# Patient Record
Sex: MALE | Race: WHITE | NOT HISPANIC OR LATINO | Employment: FULL TIME | URBAN - METROPOLITAN AREA
[De-identification: names, ages, dates, MRNs, and addresses within clinical notes are randomized per-mention and may not be internally consistent; named-entity substitution may affect disease eponyms.]

---

## 2019-04-11 ENCOUNTER — HOSPITAL ENCOUNTER (EMERGENCY)
Facility: CLINIC | Age: 24
Discharge: HOME | End: 2019-04-11
Admitting: PHYSICIAN ASSISTANT
Payer: OTHER MISCELLANEOUS

## 2019-04-11 VITALS
RESPIRATION RATE: 16 BRPM | OXYGEN SATURATION: 100 % | HEART RATE: 85 BPM | TEMPERATURE: 97.8 F | DIASTOLIC BLOOD PRESSURE: 81 MMHG | SYSTOLIC BLOOD PRESSURE: 151 MMHG

## 2019-04-11 DIAGNOSIS — S61.217A LACERATION OF LEFT LITTLE FINGER WITHOUT FOREIGN BODY WITHOUT DAMAGE TO NAIL, INITIAL ENCOUNTER: ICD-10-CM

## 2019-04-11 PROCEDURE — 12001 RPR S/N/AX/GEN/TRNK 2.5CM/<: CPT

## 2019-04-11 PROCEDURE — 27210282 ZZH ADHESIVE DERMABOND SKIN

## 2019-04-11 PROCEDURE — 99283 EMERGENCY DEPT VISIT LOW MDM: CPT

## 2019-04-11 ASSESSMENT — ENCOUNTER SYMPTOMS
NUMBNESS: 0
WEAKNESS: 0
WOUND: 1

## 2019-04-11 NOTE — DISCHARGE INSTRUCTIONS
Watch the area surrounding the wound for signs of infection which can include increased redness, drainage, fevers, or swelling. Inspect the area daily. No swimming or baths for the next 3-5 days, showering is ok. Avoid lotions/soaps to the area.     Discharge Instructions  Laceration (Cut)    You were seen today for a laceration (cut).  Your doctor examined your laceration for any problems such a buried foreign body (like glass, a splinter, or gravel), or injury to blood vessels, tendons, and nerves.  Your doctor may have also rinsed and/or scrubbed your laceration to help prevent an infection.  Your laceration may have been closed with glue, staples or sutures (stitches).      It may not be possible to find all problems with your laceration on the first visit, and we can't always prevent infections.  Antibiotics are only given when the benefit is more than the risk, and don't prevent all infections. Some lacerations are too high risk to close, and are left open to heal.  All lacerations, no matter how expertly repaired, will cause scarring.    Return to the Emergency Department right away if:  You have more redness, swelling, pain, drainage (pus), a bad smell, or red streaking from your laceration.    You have a fever of 101oF or more.  You have bleeding that you can?t stop at home. If your cut starts to bleed, hold pressure on the bleeding area with a clean cloth or put pressure over the bandage.  If the bleeding doesn?t stop after using constant pressure for 30 minutes, you should return to the Emergency Department for further treatment.  An area past the laceration is cool, pale, or blue compared with the other side, or has a slower return of color when squeezed.  Your dressing seems too tight or starts to get uncomfortable or painful.  You have loss of normal function or use of an area, such as being unable to straighten or bend a finger normally.  You have a numb area past the laceration.    Return to the  "Emergency Department or see your regular doctor if:  The laceration starts to come open.   You have something coming out of the cut or a feeling that there is something in the laceration.  Your wound will not heal, or keeps breaking open. There can always be glass, wood, dirt or other things in any wound.  They won?t always show up, even on x-rays.  If a wound doesn?t heal, this may be why, and it is important to follow-up with your regular doctor.    Home Care:  Take your dressing off in 12 hours, or as instructed by your doctor, to check your laceration. Remove the dressing sooner if it seems too tight or painful, or if it is getting numb, tingly, or pale past the dressing.  Gently wash your laceration 2 times a day with clean cloth and soap.   It is okay to shower, but do not let the laceration soak in water.    If your laceration was closed with wound adhesive or strips: pat it dry and leave it open to the air.   For all other repairs: after you wash your laceration, or at least 2 times a day, apply bacitracin or other antibiotic ointment to the laceration, then cover it with a Band-Aid  or gauze.  Keep the laceration clean. Wear gloves or other protective clothing if you are around dirt.    Scars:  To help minimize scarring:  Wear sunscreen over the healed laceration when out in the sun.  Massage the area regularly.  You may use Vitamin E oil.  Wait a year.  Most scars will start to fade within a year.    Probiotics: If you have been given an antibiotic, you may want to also take a probiotic pill or eat yogurt with live cultures. Probiotics have \"good bacteria\" to help your intestines stay healthy. Studies have shown that probiotics help prevent diarrhea and other intestine problems (including C. diff infection) when you take antibiotics. You can buy these without a prescription in the pharmacy section of the store.     If you were given a prescription for medicine here today, be sure to read all of the " information (including the package insert) that comes with your prescription.  This will include important information about the medicine, its side effects, and any warnings that you need to know about.  The pharmacist who fills the prescription can provide more information and answer questions you may have about the medicine.  If you have questions or concerns that the pharmacist cannot address, please call or return to the Emergency Department.

## 2019-04-11 NOTE — ED PROVIDER NOTES
History     Chief Complaint:  Laceration    HPI   Rakesh Corrales is an otherwise healthy 23 year old male who presents to the ED for evaluation of a laceration. The patient reports that he was at work at [employer] this afternoon using the , when his hand slipped. He sustained a laceration to his left pinky finger secondary to the incident, so he presented to the ED for evaluation. Here in the ED, he adds that he has rinsed the area out. He rates the pain a 2/10 currently. He denies any numbness, weakness, or other symptoms or concerns. He additionally denies any nail involvement. Of note, his tetanus was last updated in 2012.    Allergies:  NKDA    Medications:    The patient is currently on no regular medications.    Past Medical History:    The patient denies any significant past medical history.    Past Surgical History:    The patient does not have any pertinent past surgical history.    Family History:    No past pertinent family history.    Social History:  Works at [employer]    Review of Systems   Skin: Positive for wound.   Neurological: Negative for weakness and numbness.   All other systems reviewed and are negative.    Physical Exam     Patient Vitals for the past 24 hrs:   BP Temp Temp src Pulse Resp SpO2   04/11/19 1702 151/81 97.8  F (36.6  C) Oral 85 16 100 %     Physical Exam  General: Well appearing, well nourished. Normal mood and affect.  Skin:  Approximately 1 cm flap-like laceration to the pad of the left 5th digit, superficial in nature. No visualized bone or tendon injury.   HEENT: Head: Normocephalic, atraumatic, no visible masses.   Eyes: Conjunctiva clear.  Neck: Supple, without lymphadenopathy.  Cardiac: Normal rate and regular rhythm, no murmur or gallop.   Lungs: Clear to auscultation.  Musculoskeletal: Normal gait and station.   Left Hand:  Full flexion extension of wrist without pain or difficulty. The finger flexors (FDS/FDP) and extensors are intact. Able to make  okay sign, thumbs up, peace sign and cross fingers.  The thumb exam is normal, including: Adduction, abduction, flexion, extension, opposition. There are no sensory deficits, Median, Ulnar, and Radial nerve function is normal. Radial artery pulsations are normal. Capillary refill is normal.   Neurologic: Oriented x 3. GCS: 15.    Emergency Department Course     Procedures:   Laceration Repair        LACERATION:  A simple and superficial clean 1 cm flap-like laceration.      LOCATION:  Left Pinky Finger      FUNCTION:  Distally sensation, circulation, motor and tendon function are intact.      PREPARATION:  Irrigation and Scrubbing with Normal Saline and Shur Clens      DEBRIDEMENT:  no debridement      CLOSURE:  Wound was closed with Dermabond    Emergency Department Course:  Nursing notes and vitals reviewed. (1711) I performed an exam of the patient as documented above.     (1724) I rechecked the patient and discussed the results of his workup thus far.     Findings and plan explained to the Patient. Patient discharged home with instructions regarding supportive care, medications, and reasons to return. The importance of close follow-up was reviewed.    I personally answered all related questions prior to discharge.     Impression & Plan      Medical Decision Making:  Rakesh Corrales is a 23 year old male who presents to the ED today for evaluation of a laceration.  Details of the patient's history can be noted in the HPI.  The wound was carefully explored and evaluated.  The laceration was closed as noted in the procedure note above.  There was no evidence of muscular, tendon, bone, or nerve damage with this laceration.  There is no evidence of foreign body.  Possible complications such as infection and scarring were reviewed with the patient.  They will return to the ED for any signs of increased redness, streaking, drainage, fevers, new concerns.  They will apply bacitracin daily.  They will follow-up with his  primary care provider or another medical facility and provided in the discharge paperwork. All questions were answered prior to the patient's discharge.  They were in agreement with the treatment plan as stated above.    Diagnosis:    ICD-10-CM    1. Laceration of left little finger without foreign body without damage to nail, initial encounter S61.217A      Disposition:  discharged to home    Scribe Disclosure:  I, Rebekah Rashel, am serving as a scribe on 4/11/2019 at 5:00 PM to personally document services performed by Connie Poon found based on my observations and the provider's statements to me.     This was created at least in part with a voice recognition software. Mistakes/typos may be present.     Rebekah Kiser  4/11/2019    EMERGENCY DEPARTMENT       Connie Poon PA  04/11/19 1949

## 2019-04-11 NOTE — ED AVS SNAPSHOT
Emergency Department  [address] [place] [id number]  Phone:  [phone]  Fax:  [phone]                                    Rakesh Corrales   MRN: [id number]    Department:   Emergency Department   Date of Visit:  4/11/2019           After Visit Summary Signature Page    I have received my discharge instructions, and my questions have been answered. I have discussed any challenges I see with this plan with the nurse or doctor.    ..........................................................................................................................................  Patient/Patient Representative Signature      ..........................................................................................................................................  Patient Representative Print Name and Relationship to Patient    ..................................................               ................................................  Date                                   Time    ..........................................................................................................................................  Reviewed by Signature/Title    ...................................................              ..............................................  Date                                               Time          22EPIC Rev 08/18

## 2020-10-17 ENCOUNTER — IMMUNIZATION (OUTPATIENT)
Dept: NURSING | Facility: CLINIC | Age: 25
End: 2020-10-17

## 2020-10-17 PROCEDURE — 90686 IIV4 VACC NO PRSV 0.5 ML IM: CPT

## 2020-10-17 PROCEDURE — 90471 IMMUNIZATION ADMIN: CPT

## 2022-09-21 ENCOUNTER — OFFICE VISIT (OUTPATIENT)
Dept: FAMILY MEDICINE | Facility: CLINIC | Age: 27
End: 2022-09-21
Payer: COMMERCIAL

## 2022-09-21 VITALS
BODY MASS INDEX: 20.74 KG/M2 | HEART RATE: 74 BPM | HEIGHT: 73 IN | DIASTOLIC BLOOD PRESSURE: 75 MMHG | OXYGEN SATURATION: 97 % | SYSTOLIC BLOOD PRESSURE: 116 MMHG | WEIGHT: 156.5 LBS | RESPIRATION RATE: 12 BRPM | TEMPERATURE: 97.8 F

## 2022-09-21 DIAGNOSIS — Z23 NEED FOR VACCINATION: ICD-10-CM

## 2022-09-21 DIAGNOSIS — B35.6 TINEA CRURIS: ICD-10-CM

## 2022-09-21 DIAGNOSIS — Z13.220 SCREENING FOR LIPID DISORDERS: ICD-10-CM

## 2022-09-21 DIAGNOSIS — Z00.00 ANNUAL PHYSICAL EXAM: Primary | ICD-10-CM

## 2022-09-21 LAB
ANION GAP SERPL CALCULATED.3IONS-SCNC: 10 MMOL/L (ref 7–15)
BUN SERPL-MCNC: 13.2 MG/DL (ref 6–20)
CALCIUM SERPL-MCNC: 9.2 MG/DL (ref 8.6–10)
CHLORIDE SERPL-SCNC: 100 MMOL/L (ref 98–107)
CHOLEST SERPL-MCNC: 154 MG/DL
CREAT SERPL-MCNC: 0.97 MG/DL (ref 0.67–1.17)
DEPRECATED HCO3 PLAS-SCNC: 27 MMOL/L (ref 22–29)
GFR SERPL CREATININE-BSD FRML MDRD: >90 ML/MIN/1.73M2
GLUCOSE SERPL-MCNC: 105 MG/DL (ref 70–99)
HDLC SERPL-MCNC: 80 MG/DL
LDLC SERPL CALC-MCNC: 64 MG/DL
NONHDLC SERPL-MCNC: 74 MG/DL
POTASSIUM SERPL-SCNC: 4.1 MMOL/L (ref 3.4–5.3)
SODIUM SERPL-SCNC: 137 MMOL/L (ref 136–145)
TRIGL SERPL-MCNC: 52 MG/DL

## 2022-09-21 PROCEDURE — 80061 LIPID PANEL: CPT | Performed by: FAMILY MEDICINE

## 2022-09-21 PROCEDURE — 80048 BASIC METABOLIC PNL TOTAL CA: CPT | Performed by: FAMILY MEDICINE

## 2022-09-21 RX ORDER — THERMOMETER, ELECTRONIC,ORAL
EACH MISCELLANEOUS 2 TIMES DAILY
Qty: 30 G | Refills: 0 | Status: SHIPPED | OUTPATIENT
Start: 2022-09-21 | End: 2024-06-10

## 2022-09-21 ASSESSMENT — ENCOUNTER SYMPTOMS
CHILLS: 0
PARESTHESIAS: 0
NAUSEA: 0
DIARRHEA: 0
HEARTBURN: 0
HEMATOCHEZIA: 0
WEAKNESS: 0
PALPITATIONS: 0
EYE PAIN: 0
JOINT SWELLING: 0
HEMATURIA: 0
HEADACHES: 0
SHORTNESS OF BREATH: 0
DIZZINESS: 0
FEVER: 0
COUGH: 0
CONSTIPATION: 0
ABDOMINAL PAIN: 0
ARTHRALGIAS: 0
DYSURIA: 0
NERVOUS/ANXIOUS: 0
SORE THROAT: 0
MYALGIAS: 0
FREQUENCY: 0

## 2022-09-21 NOTE — NURSING NOTE
"    27 year old  Chief Complaint   Patient presents with     Physical     Rash     Groin area        Blood pressure (!) 142/83, pulse 74, temperature 97.8  F (36.6  C), temperature source Skin, resp. rate 12, height 1.855 m (6' 1.03\"), weight 71 kg (156 lb 8 oz), SpO2 97 %. Body mass index is 20.63 kg/m .  There is no problem list on file for this patient.      Wt Readings from Last 2 Encounters:   09/21/22 71 kg (156 lb 8 oz)     BP Readings from Last 3 Encounters:   09/21/22 (!) 142/83   04/11/19 151/81         Current Outpatient Medications   Medication     HEMP OIL OR EXTRACT OR OTHER CBD CANNABINOID, NOT MEDICAL CANNABIS,     No current facility-administered medications for this visit.       Social History     Tobacco Use     Smoking status: Current Every Day Smoker     Packs/day: 0.50     Years: 6.00     Pack years: 3.00     Types: Vaping Device     Smokeless tobacco: Never Used   Vaping Use     Vaping Use: Every day     Substances: Nicotine, Flavoring     Devices: [employer]   Substance Use Topics     Alcohol use: Yes     Alcohol/week: 14.0 standard drinks     Types: 14 Standard drinks or equivalent per week     Drug use: Yes     Frequency: 7.0 times per week     Types: Marijuana       Health Maintenance Due   Topic Date Due     PREVENTIVE CARE VISIT  Never done     ADVANCE CARE PLANNING  Never done     Pneumococcal Vaccine: Pediatrics (0 to 5 Years) and At-Risk Patients (6 to 64 Years) (1 - PCV) Never done     HIV SCREENING  Never done     HEPATITIS C SCREENING  Never done     COVID-19 Vaccine (3 - Booster for Pfizer series) 07/16/2021     DTAP/TDAP/TD IMMUNIZATION (5 - Td or Tdap) 07/19/2022     INFLUENZA VACCINE (1) 09/01/2022       No results found for: PAP      September 21, 2022 3:03 PM    "

## 2022-09-21 NOTE — NURSING NOTE
"Injectable Influenza Immunization Documentation    1.  Has the patient received the information for the injectable influenza vaccine? YES     2. Is the patient 6 months of age or older? YES     3. Does the patient have any of the following contraindications?         Severe allergy to eggs? No     Severe allergic reaction to previous influenza vaccines? No   Severe allergy to latex? No       History of Guillain-Blackwell syndrome? No     Currently have a temperature greater than 100.4F? No        4.  Severely egg allergic patients should have flu vaccine eligibility assessed by an MD, RN, or pharmacist, and those who received flu vaccine should be observed for 15 min by an MD, RN, Pharmacist, Medical Technician, or member of clinic staff.\": YES    5. Latex-allergic patients should be given latex-free influenza vaccine Yes. Please reference the Vaccine latex table to determine if your clinic s product is latex-containing.       Vaccination given by Jenniffer Dolan CMA,[facility]  September 21, 2022 4:09 PM                "

## 2022-09-21 NOTE — PROGRESS NOTES
"Rakesh Corrales presents to [facility] today for his first visit to [facility].  He is here to have an annual exam.      ASSESSMENT/PLAN:    Annual Exam/Preventive Issues   -Check Lipids, BMP  - Give Influenza and Tdap vaccines  - Suggested more exercise, e.g. weight lifting and outdoor leisure exercise  - Suggested discontinuing Vaping. Rakesh \"not ready now\"  -Suggested having a few nights/week of abstinence from alcohol.     Encouraged to sign up for Mychart      -Specific concerns:     1. Tinea Cruris  - Tinactin  -Keep area dry  - Minimize eating sugar   - If no improvement, let us know      -Follow up: in 1 year or sooner if needed    Urban Belcher MD, MS    Introduction: Rakesh is making his first visit to our clinic.  He reports not having had an annual exam in about 10 years.  His wife felt that it would be a good idea for him to have an annual exam.  He had no specific concerns other than a groin rash.  No pain with urination.  No discharge.  No concerns of STDs.    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  They have a child, b. 10/12/2020.       Lifestyle habits and Preventive health issues:     Physical activity: Stands most of the day. Also, walks daily.     Diet is moderately healthy. \"Not enough fruits and veggies\" during lunch.     Alcohol intake involves about 7  nights/week and 1-2 drinks/night. He agrees that it's \"not the best\"    He vapes tobacco products. Also uses THC.     His sleep is pretty good.\"     ROS  PHQ-2 Score:     PHQ-2 ( 1999 Pfizer) 9/21/2022   Q1: Little interest or pleasure in doing things 0   Q2: Feeling down, depressed or hopeless 0   PHQ-2 Score 0   Q1: " Little interest or pleasure in doing things Not at all   Q2: Feeling down, depressed or hopeless Not at all   PHQ-2 Score 0       Health Maintenance   Topic Date Due     NICOTINE/TOBACCO CESSATION COUNSELING Q 1 YR  Never done     PREVENTIVE CARE VISIT  Never done     ADVANCE CARE PLANNING  Never done     Pneumococcal Vaccine: Pediatrics (0 to 5 Years) and At-Risk Patients (6 to 64 Years) (1 - PCV) Never done     HIV SCREENING  Never done     HEPATITIS C SCREENING  Never done     COVID-19 Vaccine (3 - Booster for Pfizer series) 07/16/2021     DTAP/TDAP/TD IMMUNIZATION (5 - Td or Tdap) 07/19/2022     INFLUENZA VACCINE (1) 09/01/2022     PHQ-2 (once per calendar year)  Completed     IPV IMMUNIZATION  Completed     MENINGITIS IMMUNIZATION  Completed     HEPATITIS B IMMUNIZATION  Completed         There is no problem list on file for this patient.      Past Surgical History:   Procedure Laterality Date     HERNIA REPAIR  1999       Family History   Problem Relation Age of Onset     Multiple Sclerosis Mother      No Known Problems Father      No Known Problems Brother      Liver Disease Paternal Grandfather         Non drinker     Diabetes No family hx of      Heart Disease No family hx of      Cancer No family hx of          Immunizations are as follows:      Immunization History   Administered Date(s) Administered     COVID-19,PF,Pfizer (12+ Yrs) 05/02/2021, 05/21/2021     HepB, Unspecified 1994, 1994, 06/06/1995     Hib, Unspecified 1994, 1994, 03/27/1995, 11/30/1995     Historical DTP/aP 1994, 1994, 03/27/1995, 11/30/1995, 03/09/2000     Influenza (H1N1) 02/13/2010     Influenza (IIV3) PF 12/02/2003     Influenza Vaccine IM > 6 months Valent IIV4 (Alfuria,Fluzone) 11/11/2014, 10/17/2020     Influenza Vaccine, 6+MO IM (QUADRIVALENT W/PRESERVATIVES) 11/09/2021     Influenza,INJ,MDCK,PF,Quad >6mo(Flucelvax-RMG) 01/08/2018, 10/31/2019     MMR 11/30/1995, 03/09/2000     Meningococcal  "(Menactra ) 03/14/2012     Meningococcal (Menomune ) 10/06/2005     Polio, Unspecified  1994, 1994, 03/27/1995, 03/09/2000     TD (ADULT, 7+) 10/06/2005     Tdap (Adacel,Boostrix) 07/19/2012         EXAM  BP (!) 142/83 (BP Location: Right arm, Patient Position: Sitting, Cuff Size: Adult Regular)   Pulse 74   Temp 97.8  F (36.6  C) (Skin)   Resp 12   Ht 1.855 m (6' 1.03\")   Wt 71 kg (156 lb 8 oz)   SpO2 97%   BMI 20.63 kg/m    Repeat blood pressure at 116/75    GENERAL APPEARANCE: He appears in no distress.  He answers questions after a second or 2 pause.  He reports that this is due to him being \"a careful person\"  HEENT: Neck is supple. No adenopathy, thyroid normal to palpation  RESP: Lungs clear to auscultation bilaterally.  Axillae: no palpable axillary masses or adenopathy  CV: regular rate and rhythm, normal S1 S2, no murmur, no carotid bruits  ABDOMEN: soft, nontender, without HSM or masses. Bowel sounds normal  : His skin in the right inguinal crease had a few dime size circular areas of fungal like rash.  This was predominantly on the right side.  Otherwise, he had a normal testicular exam.  No masses.  No hernias.  No lymphadenopathy.   Rectal exam: deferred  MS: Extremities normal- no gross deformities noted, no tender, hot or swollen joints.    SKIN: Other than the groin area with a fungal rash there were no other rashes or atypical moles noted  NEURO: Normal strength and tone, sensory exam grossly normal  PSYCH: mentation appears normal. and affect normal/bright.  EXT: no peripheral edema    SEE TOP OF NOTE FOR ASSESSMENT AND PLAN      Urban Belcher MD, MS  [facility] Department of Family Medicine and [facility]          Answers for HPI/ROS submitted by the patient on 9/21/2022  Frequency of exercise:: 2-3 days/week  Getting at least 3 servings of Calcium per day:: NO  Diet:: Regular (no restrictions)  Taking medications regularly:: Not Applicable  Medication " side effects:: Not applicable  Bi-annual eye exam:: NO  Dental care twice a year:: NO  Sleep apnea or symptoms of sleep apnea:: None  abdominal pain: No  Blood in stool: No  Blood in urine: No  chest pain: No  chills: No  congestion: No  constipation: No  cough: No  diarrhea: No  dizziness: No  ear pain: No  eye pain: No  nervous/anxious: No  fever: No  frequency: No  genital sores: No  headaches: No  hearing loss: No  heartburn: No  arthralgias: No  joint swelling: No  peripheral edema: No  mood changes: No  myalgias: No  nausea: No  dysuria: No  palpitations: No  Skin sensation changes: No  sore throat: No  urgency: No  rash: No  shortness of breath: No  visual disturbance: No  weakness: No  impotence: No  penile discharge: No  Additional concerns today:: No  Duration of exercise:: Greater than 60 minutes

## 2022-12-03 ENCOUNTER — OFFICE VISIT (OUTPATIENT)
Dept: URGENT CARE | Facility: URGENT CARE | Age: 27
End: 2022-12-03
Payer: COMMERCIAL

## 2022-12-03 VITALS
SYSTOLIC BLOOD PRESSURE: 118 MMHG | BODY MASS INDEX: 20.56 KG/M2 | OXYGEN SATURATION: 99 % | RESPIRATION RATE: 18 BRPM | TEMPERATURE: 97.8 F | DIASTOLIC BLOOD PRESSURE: 77 MMHG | WEIGHT: 156 LBS | HEART RATE: 60 BPM

## 2022-12-03 DIAGNOSIS — Z20.828 EXPOSURE TO INFLUENZA: Primary | ICD-10-CM

## 2022-12-03 DIAGNOSIS — R68.89 FLU-LIKE SYMPTOMS: ICD-10-CM

## 2022-12-03 LAB
DEPRECATED S PYO AG THROAT QL EIA: NEGATIVE
FLUAV AG SPEC QL IA: NEGATIVE
FLUBV AG SPEC QL IA: NEGATIVE
GROUP A STREP BY PCR: NOT DETECTED

## 2022-12-03 PROCEDURE — 87804 INFLUENZA ASSAY W/OPTIC: CPT

## 2022-12-03 PROCEDURE — 99213 OFFICE O/P EST LOW 20 MIN: CPT

## 2022-12-03 PROCEDURE — 87651 STREP A DNA AMP PROBE: CPT | Performed by: PHYSICIAN ASSISTANT

## 2022-12-03 RX ORDER — ACETAMINOPHEN 500 MG
500-1000 TABLET ORAL EVERY 6 HOURS PRN
COMMUNITY
End: 2024-06-10

## 2022-12-03 ASSESSMENT — ENCOUNTER SYMPTOMS
HEADACHES: 1
NAUSEA: 1
NUMBNESS: 1
DIZZINESS: 1
WEAKNESS: 1

## 2022-12-03 ASSESSMENT — PAIN SCALES - GENERAL: PAINLEVEL: NO PAIN (0)

## 2022-12-03 NOTE — PROGRESS NOTES
"SUBJECTIVE:   Rakesh Corrales is a 27 year old male presenting with a chief complaint of   Chief Complaint   Patient presents with     Urgent Care     Nausea     Cough     Patient is here for cough and nausea.       He is an established patient of [facility]. Presents with nausea and headache. Began 3 days ago, worsened today. States he is dizzy and has a \"weakness in his arms that feels like they fell asleep.\" No numbness, vision changes, slurred speech, or tinnitus. No injury to the head. Describes persistent nausea but no vomiting. No fevers or cough. Took Tylenol this morning. Regular coffee drinker - tried to drink some this morning after headache started.       Review of Systems   Gastrointestinal: Positive for nausea.   Neurological: Positive for dizziness, weakness, numbness and headaches.   All other systems reviewed and are negative.      Past Medical History:   Diagnosis Date     Depressive disorder      Family History   Problem Relation Age of Onset     Multiple Sclerosis Mother      No Known Problems Father      No Known Problems Brother      Liver Disease Paternal Grandfather         Non drinker     Diabetes No family hx of      Heart Disease No family hx of      Cancer No family hx of      Current Outpatient Medications   Medication Sig Dispense Refill     acetaminophen (TYLENOL) 500 MG tablet Take 500-1,000 mg by mouth every 6 hours as needed for mild pain       HEMP OIL OR EXTRACT OR OTHER CBD CANNABINOID, NOT MEDICAL CANNABIS,  (Patient not taking: Reported on 12/3/2022)       tolnaftate (TINACTIN) 1 % external cream Apply topically 2 times daily (Patient not taking: Reported on 12/3/2022) 30 g 0     Social History     Tobacco Use     Smoking status: Every Day     Packs/day: 0.50     Years: 6.00     Pack years: 3.00     Types: Vaping Device, Cigarettes     Smokeless tobacco: Never   Substance Use Topics     Alcohol use: Yes     Alcohol/week: 14.0 standard drinks     Types: 14 Standard drinks or " equivalent per week       OBJECTIVE  /77 (BP Location: Left arm, Patient Position: Sitting, Cuff Size: Adult Regular)   Pulse 60   Temp 97.8  F (36.6  C) (Oral)   Resp 18   Wt 70.8 kg (156 lb)   SpO2 99%   BMI 20.56 kg/m      Physical Exam  Vitals and nursing note reviewed.   Constitutional:       General: He is not in acute distress.     Appearance: Normal appearance. He is normal weight.   HENT:      Head: Normocephalic.      Right Ear: Tympanic membrane, ear canal and external ear normal.      Left Ear: Tympanic membrane, ear canal and external ear normal.      Nose: Nose normal.      Mouth/Throat:      Mouth: Mucous membranes are moist.      Pharynx: Oropharynx is clear.   Eyes:      Extraocular Movements: Extraocular movements intact.      Conjunctiva/sclera: Conjunctivae normal.      Pupils: Pupils are equal, round, and reactive to light.   Cardiovascular:      Rate and Rhythm: Normal rate and regular rhythm.      Heart sounds: Normal heart sounds.   Pulmonary:      Effort: Pulmonary effort is normal.      Breath sounds: Normal breath sounds.   Musculoskeletal:         General: Normal range of motion.      Cervical back: Normal range of motion.   Skin:     General: Skin is warm and dry.   Neurological:      General: No focal deficit present.      Mental Status: He is alert.      Sensory: No sensory deficit.      Coordination: Coordination normal.      Gait: Gait normal.   Psychiatric:         Mood and Affect: Mood normal.         Behavior: Behavior normal.         Labs:  Results for orders placed or performed in visit on 12/03/22 (from the past 24 hour(s))   Streptococcus A Rapid Screen w/Reflex to PCR - Clinic Collect    Specimen: Throat; Swab   Result Value Ref Range    Group A Strep antigen Negative Negative   Influenza A & B Antigen - Clinic Collect    Specimen: Nose; Swab   Result Value Ref Range    Influenza A antigen Negative Negative    Influenza B antigen Negative Negative    Narrative     Test results must be correlated with clinical data. If necessary, results should be confirmed by a molecular assay or viral culture.       X-Ray was not done.    ASSESSMENT:      ICD-10-CM    1. Exposure to influenza  Z20.828       2. Flu-like symptoms  R68.89            Medical Decision Making:    Differential Diagnosis:  URI Adult/Peds:  Bronchitis-viral, Influenza, Migraine, Strep pharyngitis, Tension headache, Viral pharyngitis, Viral syndrome and Viral upper respiratory illness    Serious Comorbid Conditions:  Adult:  reviewed    PLAN:    Headache:  Tylenol, Ibuprofen as needed. Discussed presumed Influenza diagnosis given exposure to his wife who is positive in clinic today. Stay well hydrated. Discussed expected course of illness and red flag symptoms for which to seek immediate follow up.     Followup:    If not improving or if condition worsens, follow up with your Primary Care Provider    There are no Patient Instructions on file for this visit.

## 2023-11-05 ENCOUNTER — HEALTH MAINTENANCE LETTER (OUTPATIENT)
Age: 28
End: 2023-11-05

## 2023-12-03 ENCOUNTER — OFFICE VISIT (OUTPATIENT)
Dept: URGENT CARE | Facility: URGENT CARE | Age: 28
End: 2023-12-03
Payer: COMMERCIAL

## 2023-12-03 ENCOUNTER — E-VISIT (OUTPATIENT)
Dept: URGENT CARE | Facility: CLINIC | Age: 28
End: 2023-12-03
Payer: COMMERCIAL

## 2023-12-03 VITALS
HEART RATE: 74 BPM | DIASTOLIC BLOOD PRESSURE: 69 MMHG | SYSTOLIC BLOOD PRESSURE: 113 MMHG | RESPIRATION RATE: 16 BRPM | BODY MASS INDEX: 21.03 KG/M2 | OXYGEN SATURATION: 98 % | TEMPERATURE: 98.5 F | WEIGHT: 159.56 LBS

## 2023-12-03 DIAGNOSIS — L42 PITYRIASIS ROSEA: Primary | ICD-10-CM

## 2023-12-03 DIAGNOSIS — Z23 FLU VACCINE NEED: ICD-10-CM

## 2023-12-03 DIAGNOSIS — R21 RASH: Primary | ICD-10-CM

## 2023-12-03 DIAGNOSIS — Z23 NEED FOR VACCINATION: ICD-10-CM

## 2023-12-03 PROCEDURE — 90471 IMMUNIZATION ADMIN: CPT | Performed by: INTERNAL MEDICINE

## 2023-12-03 PROCEDURE — 99213 OFFICE O/P EST LOW 20 MIN: CPT | Mod: 25 | Performed by: INTERNAL MEDICINE

## 2023-12-03 PROCEDURE — 90686 IIV4 VACC NO PRSV 0.5 ML IM: CPT | Performed by: INTERNAL MEDICINE

## 2023-12-03 PROCEDURE — 99207 PR NON-BILLABLE SERV PER CHARTING: CPT | Performed by: NURSE PRACTITIONER

## 2023-12-03 ASSESSMENT — ENCOUNTER SYMPTOMS: FEVER: 0

## 2023-12-03 NOTE — NURSING NOTE
Clinic Administered Medication Documentation        Patient was given annual flu vaccine. Prior to medication administration, verified patient's identity using patient s name and date of birth. Please see MAR and medication order for additional information. Patient instructed to remain in clinic for 15 minutes and report any adverse reaction to staff immediately. The patient was given the flu vaccine information sheet.    Vial/Syringe: Titi Lopez MA

## 2023-12-03 NOTE — PATIENT INSTRUCTIONS
Hydrating cream or ointment  Eucerin or aquaphor  Cortisone cream  If bad itch try antihistamine like zyrtec or benadryl.

## 2023-12-03 NOTE — PROGRESS NOTES
ASSESSMENT AND PLAN:      ICD-10-CM    1. Pityriasis rosea  L42       2. Flu vaccine need  Z23         Discussed may worsen and be present 4 to 6 weeks.  Up to 3 months        Patient Instructions     Hydrating cream or ointment  Eucerin or aquaphor  Cortisone cream  If bad itch try antihistamine like zyrtec or benadryl.      Return if symptoms worsen or fail to improve.        Negrita Chapman MD  [facility] URGENT CARE    Subjective     Rakesh Corrales is a 28 year old who presents for Patient presents with:  Urgent Care: Urgent care visit for rash.  Derm Problem: Rash on his torso and lower neck that has been present since the day prior to Thanksgiving. It has spread and got larger. It is read, raised and feels itchy. The itching is only when the lesions initially form. They do not hurt, and not weeping and do not crust over. He put some Cortisone on them but it only helped with the slight itching.    [facility]    Rash    Onset of rash was 2 week(s) ago.   Course of illness is worsening.  Current and Associated symptoms: itching and red spots.  Started on right upper chest and subsequently spread  Location of the rash: neck and torso.  Previous history of a similar rash? No  Recent exposure history: none known    Associated symptoms include: nothing.  Treatment measures tried include: otc hydrocortisone cream      Review of Systems   Constitutional:  Negative for fever.   No other illness symptoms        Objective    /69 (BP Location: Left arm, Patient Position: Sitting, Cuff Size: Adult Regular)   Pulse 74   Temp 98.5  F (36.9  C) (Tympanic)   Resp 16   Wt 72.4 kg (159 lb 9 oz)   SpO2 98%   BMI 21.03 kg/m    Physical Exam  Vitals reviewed.   Constitutional:       Appearance: Normal appearance. He is not ill-appearing.   Skin:     Findings: Rash (Red papules and oval macules with some flaking noted.  Largest red area right upper trunk consistent with herald  patch) present.   Neurological:      Mental Status: He is alert.

## 2023-12-03 NOTE — PATIENT INSTRUCTIONS
Dear Rakesh Corrales,    We are sorry you are not feeling well. Based on the responses you provided, it is recommended that you be seen in-person in urgent care so we can better evaluate your symptoms. Please click here to find the nearest urgent care location to you.   You will not be charged for this Visit. Thank you for trusting us with your care.    KAREY Herring CNP

## 2024-06-10 ENCOUNTER — OFFICE VISIT (OUTPATIENT)
Dept: FAMILY MEDICINE | Facility: CLINIC | Age: 29
End: 2024-06-10
Payer: COMMERCIAL

## 2024-06-10 VITALS
HEIGHT: 74 IN | RESPIRATION RATE: 15 BRPM | BODY MASS INDEX: 20.15 KG/M2 | HEART RATE: 80 BPM | OXYGEN SATURATION: 97 % | SYSTOLIC BLOOD PRESSURE: 131 MMHG | WEIGHT: 157 LBS | DIASTOLIC BLOOD PRESSURE: 79 MMHG | TEMPERATURE: 98.5 F

## 2024-06-10 DIAGNOSIS — Z00.00 ANNUAL PHYSICAL EXAM: Primary | ICD-10-CM

## 2024-06-10 DIAGNOSIS — Z30.09 FAMILY PLANNING: ICD-10-CM

## 2024-06-10 SDOH — HEALTH STABILITY: PHYSICAL HEALTH: ON AVERAGE, HOW MANY DAYS PER WEEK DO YOU ENGAGE IN MODERATE TO STRENUOUS EXERCISE (LIKE A BRISK WALK)?: 4 DAYS

## 2024-06-10 ASSESSMENT — SOCIAL DETERMINANTS OF HEALTH (SDOH): HOW OFTEN DO YOU GET TOGETHER WITH FRIENDS OR RELATIVES?: ONCE A WEEK

## 2024-06-10 NOTE — PROGRESS NOTES
Preventive Care Visit  [facility]    Rakesh Corrales presents to [facility] today to have an annual exam.      ASSESSMENT/PLAN:    Annual Exam/Preventive Issues   - Labs: We reviewed his labs from 2 years ago. He had normal labs in Sept 2022 except for a blood sugar of 105. He has sweets just before that blood draw and has no symptoms of diabetes.   - Immunizations: up to date except that PCV20 is indicated as he vapes tobacco. He declined the immunization and hopes to quit vaping    - Other recommendations:   Encouraged to decrease alcohol and THC usage. Discussed the issue in terms of parenting as well as personal health  Encouraged trying to find time for leisure time exercise    -Specific concerns:     Requests a vasectomy   -Referred to Urology      -Follow up: as needed or in a year    Urban Belcher MD  Family Medicine and Sports Medicine      INTRODUCTION:   Rakesh's here for his second an annual preventive exam at our clinic. Last was 2 years ago.   He and his wife have recently had their 2nd child and he's interested in a vasectomy.     Past Medical History:   Diagnosis Date    Depressive disorder      States that depression is not a problem now.     Current Medications include:   No current outpatient medications on file.     No Known Allergies    Past Surgical History:   Procedure Laterality Date    HERNIA REPAIR  1999       Health Maintenance   Topic Date Due    NICOTINE/TOBACCO CESSATION COUNSELING Q 1 YR  Never done    ADVANCE CARE PLANNING  Never done    Pneumococcal Vaccine: Pediatrics (0 to 5 Years) and At-Risk Patients (6 to 64 Years) (1 of 2 - PCV) Never done    HIV SCREENING  Never done    HEPATITIS C SCREENING  Never done    COVID-19 Vaccine (3 - 2023-24 season) 09/01/2023    YEARLY PREVENTIVE VISIT  09/21/2023    DTAP/TDAP/TD IMMUNIZATION (8 - Td or Tdap) 09/21/2032    PHQ-2 (once per calendar year)  Completed    INFLUENZA VACCINE  Completed    IPV IMMUNIZATION  Completed     MENINGITIS IMMUNIZATION  Completed    HEPATITIS B IMMUNIZATION  Completed    HPV IMMUNIZATION  Aged Out    RSV MONOCLONAL ANTIBODY  Aged Out       Immunizations are as follows:      Immunization History   Administered Date(s) Administered    COVID-19 MONOVALENT 12+ (Pfizer) 05/02/2021, 05/21/2021    HIB, Unspecified 1994, 1994, 03/27/1995, 11/30/1995    HepB, Unspecified 1994, 1994, 06/06/1995    Historical DTP/aP 1994, 1994, 03/27/1995, 11/30/1995, 03/09/2000    Influenza (H1N1) 02/13/2010    Influenza (IIV3) PF 12/02/2003    Influenza Vaccine >6 months,quad, PF 11/11/2014, 10/17/2020, 09/21/2022, 12/03/2023    Influenza Vaccine, 6+MO IM (QUADRIVALENT W/PRESERVATIVES) 11/09/2021    Influenza,INJ,MDCK,PF,Quad >6mo(Flucelvax) 01/08/2018, 10/31/2019    MMR 11/30/1995, 03/09/2000    Meningococcal (Menomune ) 10/06/2005    Meningococcal ACWY (Menactra ) 03/14/2012    Polio, Unspecified 1994, 1994, 03/27/1995, 03/09/2000    TD,PF 7+ (Tenivac) 10/06/2005    TDAP (Adacel,Boostrix) 07/19/2012, 09/21/2022             6/10/2024   General Health   How would you rate your overall physical health? Good   Feel stress (tense, anxious, or unable to sleep) Only a little   (!) STRESS CONCERN      6/10/2024   Nutrition   Three or more servings of calcium each day? Yes   Diet: Regular (no restrictions)   How many servings of fruit and vegetables per day? (!) 2-3   How many sweetened beverages each day? (!) 2         6/10/2024   Exercise   Days per week of moderate/strenous exercise 4 days         6/10/2024   Social Factors   Frequency of gathering with friends or relatives Once a week   Worry food won't last until get money to buy more No   Food not last or not have enough money for food? No   Do you have housing?  Yes   Are you worried about losing your housing? No   Lack of transportation? No   Unable to get utilities (heat,electricity)? No         6/10/2024   Dental   Dentist two times  every year? (!) NO         6/10/2024   TB Screening   Were you born outside of the [place]? No         Today's PHQ-2 Score:       6/10/2024     3:24 PM   PHQ-2 ( 1999 Pfizer)   Q1: Little interest or pleasure in doing things 1   Q2: Feeling down, depressed or hopeless 0   PHQ-2 Score 1   Q1: Little interest or pleasure in doing things Several days   Q2: Feeling down, depressed or hopeless Not at all   PHQ-2 Score 1           6/10/2024   Substance Use   Alcohol more than 3/day or more than 7/wk Yes   How often do you have a drink containing alcohol 4 or more times a week   How many alcohol drinks on typical day 1 or 2   How often do you have 5+ drinks at one occasion Less than monthly   Audit 2/3 Score 1   How often not able to stop drinking once started Never   How often failed to do what normally expected Never   How often needed first drink in am after a heavy drinking session Never   How often feeling of guilt or remorse after drinking Never   How often unable to remember what happened the night before Never   Have you or someone else been injured because of your drinking No   Has anyone been concerned or suggested you cut down on drinking No   TOTAL SCORE - AUDIT 5   Do you use any other substances recreationally? (!) ALCOHOL    (!) CANNABIS PRODUCTS     Social History     Tobacco Use    Smoking status: Every Day     Current packs/day: 0.50     Average packs/day: 0.5 packs/day for 6.0 years (3.0 ttl pk-yrs)     Types: Vaping Device, Cigarettes     Passive exposure: Never    Smokeless tobacco: Never   Vaping Use    Vaping status: Every Day    Substances: Nicotine, Flavoring    Devices: Refillable tank   Substance Use Topics    Alcohol use: Yes     Alcohol/week: 14.0 standard drinks of alcohol     Types: 14 Standard drinks or equivalent per week    Drug use: Yes     Frequency: 7.0 times per week     Types: Marijuana         Alcohol intake involves about 7 nights/week and 2 drinks/night and weekdays and 3-4 on weekends.  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      to Radhika, an .  They have a child, b. 10/12/2020 and a second b 3/1/2024.     Enjoys: Playing basketball, hiking, camping, and playing video games.        No concerns re: STDs      ROS  PHQ-2 Score:         6/10/2024     3:24 PM 9/21/2022     2:55 PM   PHQ-2 ( 1999 Pfizer)   Q1: Little interest or pleasure in doing things 1 0   Q2: Feeling down, depressed or hopeless 0 0   PHQ-2 Score 1 0   Q1: Little interest or pleasure in doing things Several days Not at all   Q2: Feeling down, depressed or hopeless Not at all Not at all   PHQ-2 Score 1 0       EXAM  /79 (BP Location: Left arm, Patient Position: Sitting, Cuff Size: Adult Regular)   Pulse 80   Temp 98.5  F (36.9  C) (Temporal)   Resp 15   Ht 1.87 m (6' 1.62\")   Wt 71.2 kg (157 lb)   SpO2 97%   BMI 20.37 kg/m      GENERAL APPEARANCE:   Appears well and in no distress  HEENT:  eyes and ears are normal.  Neck is supple. No adenopathy, thyroid normal to palpation  RESP: Lungs clear to auscultation bilaterally.  Axillae: no palpable axillary masses or adenopathy  CV: regular rate and rhythm, normal S1 S2,  no murmur, no carotid bruits  ABDOMEN: nontender, without HSM or masses. Bowel sounds normal  : Deferred. Reported no concerns.   Rectal exam: deferred  MS:  full range of motion of all extremities.  Gait is normal..  SKIN: No suspicious lesions or rashes  NEURO: Normal strength and tone, sensory exam grossly normal  PSYCH: mentation and affect appear normal.   EXT: no peripheral edema        SEE TOP OF NOTE FOR ASSESSMENT AND PLAN      Urban eBlcher MD  Family Medicine and Sports Medicine  [facility] Department of Family Medicine and Community Health    "

## 2024-06-10 NOTE — NURSING NOTE
"28 year old  Chief Complaint   Patient presents with    Physical     vasectomy       Blood pressure 131/79, pulse 80, temperature 98.5  F (36.9  C), temperature source Temporal, resp. rate 15, height 1.87 m (6' 1.62\"), weight 71.2 kg (157 lb), SpO2 97%. Body mass index is 20.37 kg/m .  There is no problem list on file for this patient.      Wt Readings from Last 2 Encounters:   06/10/24 71.2 kg (157 lb)   12/03/23 72.4 kg (159 lb 9 oz)     BP Readings from Last 3 Encounters:   06/10/24 131/79   12/03/23 113/69   12/03/22 118/77         Current Outpatient Medications   Medication Sig Dispense Refill    acetaminophen (TYLENOL) 500 MG tablet Take 500-1,000 mg by mouth every 6 hours as needed for mild pain (Patient not taking: Reported on 12/3/2023)      HEMP OIL OR EXTRACT OR OTHER CBD CANNABINOID, NOT MEDICAL CANNABIS,  (Patient not taking: Reported on 12/3/2022)      tolnaftate (TINACTIN) 1 % external cream Apply topically 2 times daily (Patient not taking: Reported on 12/3/2022) 30 g 0     No current facility-administered medications for this visit.       Social History     Tobacco Use    Smoking status: Former     Current packs/day: 0.50     Average packs/day: 0.5 packs/day for 6.0 years (3.0 ttl pk-yrs)     Types: Vaping Device, Cigarettes     Passive exposure: Never    Smokeless tobacco: Never   Vaping Use    Vaping status: Every Day    Substances: Nicotine, Flavoring    Devices: [employer] tank   Substance Use Topics    Alcohol use: Yes     Alcohol/week: 14.0 standard drinks of alcohol     Types: 14 Standard drinks or equivalent per week    Drug use: Yes     Frequency: 7.0 times per week     Types: Marijuana       Health Maintenance Due   Topic Date Due    NICOTINE/TOBACCO CESSATION COUNSELING Q 1 YR  Never done    ADVANCE CARE PLANNING  Never done    Pneumococcal Vaccine: Pediatrics (0 to 5 Years) and At-Risk Patients (6 to 64 Years) (1 of 2 - PCV) Never done    HIV SCREENING  Never done    HEPATITIS C " "SCREENING  Never done    COVID-19 Vaccine (3 - 2023-24 season) 09/01/2023    YEARLY PREVENTIVE VISIT  09/21/2023       No results found for: \"PAP\"      Michelle 10, 2024 3:39 PM    "

## 2024-06-10 NOTE — PATIENT INSTRUCTIONS
ASSESSMENT/PLAN:    Annual Exam/Preventive Issues   - Labs: We reviewed his labs from 2 years ago. He had normal labs in Sept 2022 except for a blood sugar of 105. He has sweets just before that blood draw and has no symptoms of diabetes.   - Immunizations: up to date except that PCV20 is indicated as he vapes tobacco. He declined the immunization and hopes to quit vaping    - Other recommendations:   Encouraged to decrease alcohol and THC usage. Discussed the issue in terms of parenting as well as personal health  Encouraged trying to find time for leisure time exercise    -Specific concerns:     Requests a vasectomy   -Referred to Urology      -Follow up: as needed or in a year    Urban Belcher MD  Family Medicine and Sports Medicine

## 2024-08-09 ENCOUNTER — PRE VISIT (OUTPATIENT)
Dept: UROLOGY | Facility: CLINIC | Age: 29
End: 2024-08-09
Payer: COMMERCIAL

## 2024-08-09 NOTE — TELEPHONE ENCOUNTER
MEDICAL RECORDS REQUEST   [facility]  Urology Clinic  [address] [place] [id number]  PHONE: [phone]  Fax: [phone]        FUTURE VISIT INFORMATION                                                   Rakesh Corrales, : 1995 scheduled for future visit at [facility]    APPOINTMENT INFORMATION:  Date: 2024  Provider:  Marcin Sanchez MD  Reason for Visit/Diagnosis: VASECTOMY CONSULT    REFERRAL INFORMATION:  Referring provider:  Urban Belcher MD in [facility] PRIMARY CARE      RECORDS REQUESTED FOR VISIT                                                     NOTES  STATUS/DETAILS   OFFICE NOTE from referring provider  yes, 06/10/2024 -- Urban Belcher MD in [facility] PRIMARY CARE   MEDICATION LIST  yes     PRE-VISIT CHECKLIST      Joint diagnostic appointment coordinated correctly          (ensure right order & amount of time) Yes   RECORD COLLECTION COMPLETE Yes

## 2024-08-09 NOTE — TELEPHONE ENCOUNTER
Reason for visit: Family planning    Records/imaging/labs/orders: all records available    Pt called: no need for a call    At Rooming:  Standard rooming      Torres Martin  8/9/2024  2:58 PM

## 2024-09-19 ENCOUNTER — OFFICE VISIT (OUTPATIENT)
Dept: UROLOGY | Facility: CLINIC | Age: 29
End: 2024-09-19
Attending: FAMILY MEDICINE
Payer: COMMERCIAL

## 2024-09-19 ENCOUNTER — PRE VISIT (OUTPATIENT)
Dept: UROLOGY | Facility: CLINIC | Age: 29
End: 2024-09-19

## 2024-09-19 VITALS
HEART RATE: 67 BPM | HEIGHT: 73 IN | DIASTOLIC BLOOD PRESSURE: 88 MMHG | OXYGEN SATURATION: 98 % | SYSTOLIC BLOOD PRESSURE: 138 MMHG | WEIGHT: 170 LBS | BODY MASS INDEX: 22.53 KG/M2

## 2024-09-19 DIAGNOSIS — Z30.09 FAMILY PLANNING: ICD-10-CM

## 2024-09-19 DIAGNOSIS — Z30.09 ENCOUNTER FOR VASECTOMY COUNSELING: Primary | ICD-10-CM

## 2024-09-19 PROCEDURE — 99202 OFFICE O/P NEW SF 15 MIN: CPT | Performed by: UROLOGY

## 2024-09-19 ASSESSMENT — PAIN SCALES - GENERAL: PAINLEVEL: NO PAIN (0)

## 2024-09-19 NOTE — LETTER
9/19/2024       RE: Rakesh Corrales  [address]  [facility] [address]     Dear Colleague,    Thank you for referring your patient, Rakesh Corrales, to the [facility]OLOG[facility] [place] at [facility]. Please see a copy of my visit note below.    CC: Desires sterilization, consult for vasectomy from Dr. Belcher    HPI: Rakesh Corrales is a 29 year old male with 2 children, and he is intersted in getting a vasectomy for sterilization.      No history of  trauma or scrotal surgery.  No history of bleeding problems.    MA/ [facility]? No    PMH:   Past Medical History:   Diagnosis Date     Depressive disorder    Hernia repair as a child.    FAMILY HX:   Family History   Problem Relation Age of Onset     Multiple Sclerosis Mother      No Known Problems Father      No Known Problems Brother      Liver Disease Paternal Grandfather         Non drinker     Diabetes No family hx of      Heart Disease No family hx of      Cancer No family hx of       No history of coagulopathies none    ALLERGIES:    No Known Allergies    MEDS:   No prescription medications at this time.      SOCIAL HISTORY:  Social History     Tobacco Use     Smoking status: Former     Current packs/day: 0.50     Average packs/day: 0.5 packs/day for 6.0 years (3.0 ttl pk-yrs)     Types: Vaping Device, Cigarettes     Passive exposure: Never     Smokeless tobacco: Never   Vaping Use     Vaping status: Every Day     Substances: Nicotine, Flavoring     Devices: Refillable tank   Substance Use Topics     Alcohol use: Yes     Alcohol/week: 14.0 standard drinks of alcohol     Types: 14 Standard drinks or equivalent per week     Drug use: Yes     Frequency: 7.0 times per week     Types: Marijuana      GENERAL PHYSICAL EXAM:   There were no vitals taken for this visit.   Constitutional: No acute distress. Well nourished.   PSYCH: Normal mood and affect.   NEURO: Normal gait, no focal deficits.    EYES: Anicteric, EOMI, PERR  CARDIOPULMONARY: Breathing non-labored, pulse regular, no peripheral edema.   GI: Abdomen soft, nondistended   MUSCULOSKELETAL: Normal limb proportions, no muscle wasting, no contractures.    SKIN: Normal virilized hair distribution, no lesions, warts or rashes over genitalia, abdomen extremities or face.   HEME/LYMPH: No echymosis, no lymphadenopathy in groin, no lymphedema.     EXAM:  Phallus normal   Testes descended, consistency is normal. No intra-testicular masses.  Epididymes present.  Vas present bilateraly, Both are easy to find.  MARLENY deferred.        I discussed with him at length the risks and benefits of the procedure. He understands that this is a sterilization procedure, and not reversible contraception. He understands that reversals, while possible, are not guaranteed to work and fairly complex. I discussed with him the option of sperm cryopreservation.     I stressed that he continues to be fertile in the post-operative period, and that he should continue using other contraceptive methods, such as a condom, until he obtains a semen analysis and we review the results to confirm success of the procedure and infertility. I also stressed to him that recanalization and pregnancy can possibly occur (approx 1/2000 chance), even after we clear him with a semen analysis showing no motile sperm. I counseled him on the rosetta-operative risks of bleeding, infection, pain.  I described to him post-vasectomy pain syndrome that can occur in about 1 to 2% of men undergoing vasectomy. Usually this improves with time but in very rare cases can be persisting.    We also discussed recovery times (typically days if no complications) and post-operative care including use of ice packs, pain medication and wound care.    Plan:  - Schedule vasectomy procedure in clinic.       --------------------------------------------------------------------------------------------------------------------    Additional Coding Information:    Problems:  one acute uncomplicated illness or injury    Data Reviewed  Minimal or none    Level of risk:   low risk (e.g., OTC medication or observation, minor surgery without risks)    Time spent:  10 minutes spent on the date of the encounter doing chart review, history and exam, documentation and further activities as noted above.        Again, thank you for allowing me to participate in the care of your patient.      Sincerely,    Marcin Sanchez MD

## 2024-09-19 NOTE — PROGRESS NOTES
CC: Desires sterilization, consult for vasectomy from Dr. Belcher    HPI: Rakesh Corrales is a 29 year old male with 2 children, and he is intersted in getting a vasectomy for sterilization.      No history of  trauma or scrotal surgery.  No history of bleeding problems.    [facility]/ [facility]? No    PMH:   Past Medical History:   Diagnosis Date    Depressive disorder    Hernia repair as a child.    FAMILY HX:   Family History   Problem Relation Age of Onset    Multiple Sclerosis Mother     No Known Problems Father     No Known Problems Brother     Liver Disease Paternal Grandfather         Non drinker    Diabetes No family hx of     Heart Disease No family hx of     Cancer No family hx of       No history of coagulopathies none    ALLERGIES:    No Known Allergies    MEDS:   No prescription medications at this time.      SOCIAL HISTORY:  Social History     Tobacco Use    Smoking status: Former     Current packs/day: 0.50     Average packs/day: 0.5 packs/day for 6.0 years (3.0 ttl pk-yrs)     Types: Vaping Device, Cigarettes     Passive exposure: Never    Smokeless tobacco: Never   Vaping Use    Vaping status: Every Day    Substances: Nicotine, Flavoring    Devices: "[employer] tank   Substance Use Topics    Alcohol use: Yes     Alcohol/week: 14.0 standard drinks of alcohol     Types: 14 Standard drinks or equivalent per week    Drug use: Yes     Frequency: 7.0 times per week     Types: Marijuana      GENERAL PHYSICAL EXAM:   There were no vitals taken for this visit.   Constitutional: No acute distress. Well nourished.   PSYCH: Normal mood and affect.   NEURO: Normal gait, no focal deficits.   EYES: Anicteric, EOMI, PERR  CARDIOPULMONARY: Breathing non-labored, pulse regular, no peripheral edema.   GI: Abdomen soft, nondistended   MUSCULOSKELETAL: Normal limb proportions, no muscle wasting, no contractures.    SKIN: Normal virilized hair distribution, no lesions, warts or rashes over genitalia, abdomen extremities or  face.   HEME/LYMPH: No echymosis, no lymphadenopathy in groin, no lymphedema.     EXAM:  Phallus normal   Testes descended, consistency is normal. No intra-testicular masses.  Epididymes present.  Vas present bilateraly, Both are easy to find.  MARLENY deferred.        I discussed with him at length the risks and benefits of the procedure. He understands that this is a sterilization procedure, and not reversible contraception. He understands that reversals, while possible, are not guaranteed to work and fairly complex. I discussed with him the option of sperm cryopreservation.     I stressed that he continues to be fertile in the post-operative period, and that he should continue using other contraceptive methods, such as a condom, until he obtains a semen analysis and we review the results to confirm success of the procedure and infertility. I also stressed to him that recanalization and pregnancy can possibly occur (approx 1/2000 chance), even after we clear him with a semen analysis showing no motile sperm. I counseled him on the rosetta-operative risks of bleeding, infection, pain.  I described to him post-vasectomy pain syndrome that can occur in about 1 to 2% of men undergoing vasectomy. Usually this improves with time but in very rare cases can be persisting.    We also discussed recovery times (typically days if no complications) and post-operative care including use of ice packs, pain medication and wound care.    Plan:  - Schedule vasectomy procedure in clinic.       --------------------------------------------------------------------------------------------------------------------   Additional Coding Information:    Problems:  one acute uncomplicated illness or injury    Data Reviewed  Minimal or none    Level of risk:   low risk (e.g., OTC medication or observation, minor surgery without risks)    Time spent:  10 minutes spent on the date of the encounter doing chart review, history and exam, documentation  and further activities as noted above.

## 2024-09-19 NOTE — PATIENT INSTRUCTIONS
Can you please schedule a vasectomy at your earliest convenience?  Thank you!  Torres Martin  It was a pleasure meeting with you today.  Thank you for allowing me and my team the privilege of caring for you today.  YOU are the reason we are here, and I truly hope we provided you with the excellent service you deserve.  Please let us know if there is anything else we can do for you so that we can be sure you are leaving completely satisfied with your care experience.

## 2024-09-19 NOTE — NURSING NOTE
"Chief Complaint   Patient presents with    Consult     Here for a vasectomy consult        Blood pressure 138/88, pulse 67, height 1.854 m (6' 1\"), weight 77.1 kg (170 lb), SpO2 98%. Body mass index is 22.43 kg/m .    There is no problem list on file for this patient.      No Known Allergies    No current outpatient medications on file.       Social History     Tobacco Use    Smoking status: Former     Current packs/day: 0.50     Average packs/day: 0.5 packs/day for 6.0 years (3.0 ttl pk-yrs)     Types: Vaping Device, Cigarettes     Passive exposure: Never    Smokeless tobacco: Never   Vaping Use    Vaping status: Every Day    Substances: Nicotine, Flavoring    Devices: Refillable tank   Substance Use Topics    Alcohol use: Yes     Alcohol/week: 14.0 standard drinks of alcohol     Types: 14 Standard drinks or equivalent per week    Drug use: Yes     Frequency: 7.0 times per week     Types: Marijuana       Torres Martin  9/19/2024  1:38 PM     "

## 2024-12-05 ENCOUNTER — PRE VISIT (OUTPATIENT)
Dept: UROLOGY | Facility: CLINIC | Age: 29
End: 2024-12-05
Payer: COMMERCIAL

## 2024-12-05 DIAGNOSIS — Z30.2 ENCOUNTER FOR VASECTOMY: Primary | ICD-10-CM

## 2024-12-05 NOTE — TELEPHONE ENCOUNTER
Patient coming in for vasectomy procedure    Sent Convers[employer] message with prep which included:    Stop all blood thinners for seven days prior to procedure  Eat a meal prior to procedure  Shave the hair from the base of the penis and scrotum the night prior to procedure.      Torres Martin  12/05/24  9:48 AM

## 2024-12-19 ENCOUNTER — OFFICE VISIT (OUTPATIENT)
Dept: UROLOGY | Facility: CLINIC | Age: 29
End: 2024-12-19
Payer: COMMERCIAL

## 2024-12-19 VITALS
WEIGHT: 165 LBS | HEART RATE: 104 BPM | BODY MASS INDEX: 21.87 KG/M2 | OXYGEN SATURATION: 100 % | SYSTOLIC BLOOD PRESSURE: 143 MMHG | HEIGHT: 73 IN | DIASTOLIC BLOOD PRESSURE: 89 MMHG

## 2024-12-19 DIAGNOSIS — Z30.2 ENCOUNTER FOR VASECTOMY: Primary | ICD-10-CM

## 2024-12-19 ASSESSMENT — PAIN SCALES - GENERAL: PAINLEVEL_OUTOF10: NO PAIN (0)

## 2024-12-19 NOTE — NURSING NOTE
"Chief Complaint   Patient presents with    Sterilization     Vasectomy       Blood pressure (!) 143/89, pulse 104, height 1.842 m (6' 0.5\"), weight 74.8 kg (165 lb), SpO2 100%. Body mass index is 22.07 kg/m .    There is no problem list on file for this patient.      No Known Allergies    No current outpatient medications on file.       Social History     Tobacco Use    Smoking status: Former     Current packs/day: 0.50     Average packs/day: 0.5 packs/day for 6.0 years (3.0 ttl pk-yrs)     Types: Vaping Device, Cigarettes     Passive exposure: Never    Smokeless tobacco: Never   Vaping Use    Vaping status: Every Day    Substances: Nicotine, Flavoring    Devices: Refillable tank   Substance Use Topics    Alcohol use: Yes     Alcohol/week: 14.0 standard drinks of alcohol     Types: 14 Standard drinks or equivalent per week    Drug use: Yes     Frequency: 7.0 times per week     Types: Marijuana       Invasive Procedure Safety Checklist:    Procedure: Bilateral Vasectomy    Action: Complete sections and checkboxes as appropriate.    Pre-procedure:  1. Patient ID Verified with 2 identifiers (Anais and  or MRN) : YES    2. Procedure and site verified with patient/designee (when able) : YES    3. Accurate consent documentation in medical record : YES    4. H&P (or appropriate assessment) documented in medical record : N/A  H&P must be up to 30 days prior to procedure an updated within 24 hours of                 Procedure as applicable.     5. Relevant diagnostic and radiology test results appropriately labeled and displayed as applicable : YES    6. Blood products, implants, devices, and/or special equipment available for the procedure as applicable : YES    7. Procedure site(s) marked with provider initials [Exclusions: none] : NO    8. Marking not required. Reason : Yes  Procedure does not require site marking    Time Out:     Time-Out performed immediately prior to starting procedure, including verbal and active " participation of all team members addressing: YES    1. Correct patient identity.  2. Confirmed that the correct side and site are marked.  3. An accurate procedure to be done.  4. Agreement on the procedure to be done.  5. Correct patient position.  6. Relevant images and results are properly labeled and appropriately displayed.  7. The need to administer antibiotics or fluids for irrigation purposes during the procedure as applicable.  8. Safety precautions based on patient history or medication use.    During Procedure: Verification of correct person, site, and procedure occurs any time the responsibility for care of the patient is transferred to another member of the care team.    The following medication was given:     MEDICATION:  Lidocaine without epinephrine 2%  ROUTE: administered by physician - scrotal   SITE: administered by physician - scrotal   DOSE: 10 mL  LOT #: [id number]  : [employer]   EXPIRATION DATE: 07/27  NDC#: 78363-161-78   Was there drug waste? Yes  Amount of drug waste (mL): 10 mL.  Reason for waste:  Single use vial    Prior to injection, verified patient identity using patient's name and date of birth.  Due to injection administration, patient instructed to remain in clinic for 15 minutes  afterwards, and to report any adverse reaction to me immediately.    Drug Amount Wasted:  Yes: 10 mL (20 mg/ml)  Vial/Syringe: Single dose vial      Torres Martin  12/19/2024  8:00 AM

## 2024-12-19 NOTE — LETTER
12/19/2024       RE: Rakesh Corrales  [address]  [facility] [address]     Dear Colleague,    Thank you for referring your patient, Rakesh Corrales, to the [facility] UROLOGY CLINIC [place] at [facility]. Please see a copy of my visit note below.    Procedure: Vasectomy bilateral.   Anesthesia: Local lidocaine injection by surgeon.  Surgeon: CONSTANTINE Sanchez M.D.   Assistant:  Celia Quintana MD    Description of procedure: The patient has received education regarding vasectomy, including risks and benefits, we obtaiined consent and proceeded with the surgery. He understands that there is a risk of late failure from recanalization. He understands that he is fertile until he has completed one or more semen analyses and he is given clearance from us for unprotected intercourse.  He understands that we will contact him regarding the results of the post-vasectomy semen analysis, but it is his responsibility to make sure he is cleared before discontinuing other birth control methods.  I have discussed with him other risks including bleeding, infection, acute and possible long-term pain.    The right vas was ligated first.  This was done using the standard technique by grasping it with a three-finger  and lifting it up to the skin.  A small amount of lidocaine was infiltrated into the skin and vasal sheath.  The skin was punctured and dilated bluntly using the scalpel-less dissector.  The sheath was identified and a rigid clamp was passed around the vas which was then lifted out of the incision.  The vas was cleaned off from the deferential vessels and the sheath, and cautery was used to divide the vas between mosquitos.  A small segment was removed and discarded.  The lumen of the vas was cannulated to confirm its identity, and the lumens of both ends were cauterized.  Pen cautery was used sparingly/as needed for minor bleeders.  A facial interposition  was then performed with a single suture of 4-0 chromic. The skin defect was closed with a 4-0 chromic interrupted suture after confirming adequate hemostasis.       We then turned our attention to the left side and a similar technique was performed. This involved division, excision of a segment, cautery of the lumens, and fascial interposition.    There were no hematomas noted at the end of the procedure.  The patient tolerated the procedure well.    He was advised to return for a post vasectomy semen check in 2-3 months, and that he is not sterile and must continue to use contraception until we tell him otherwise (based on follow-up semen specimen(s)).    Plan:  -Post vasectomy semen analysis in 2-3 months   -ice packs to scrotum X 24h  -shower ok tomorrow  -OTC analgesics recommended     I was present and scrubbed for the entire procedure.  Marcin Sanchez MD  Urology Staff               Again, thank you for allowing me to participate in the care of your patient.      Sincerely,    Marcin Sanchez MD

## 2024-12-19 NOTE — PATIENT INSTRUCTIONS
What Can I Expect After My Vasectomy?     Your scrotum may be swollen and bruised. We suggest you:  - Raise the area and apply ice packs (or frozen vegetables). Use the ice packs for 20 minutes on and 20 minutes off for 24 to 36 hours.  - Wear a jock strap or tight briefs for support for a week.  - Limit your activity for the first 24 to 36 hours. Wait up to 48 hours, if you feel the need. No heavy lifting for 1 week.     You should be able to return to work the next day, unless you do heavy physical work.     You can safely ejaculate (have a sexual climax) in about one week, or when it feels comfortable.    Risk of pregnancy    After your vasectomy, you can still get your partner pregnant for three months or more. Use extra birth control, such as condoms, until tests show that you are sterile (no live sperm).    Vasectomy is not 100 percent reliable. Pregnancy may occur for about 1 out of 2000 men even after testing shows zero sperm count.    Can a vasectomy be reversed?    Vasectomy is meant to be birth control that lasts a lifetime. If you change your mind, we can try to reverse it with surgery. But this will not be successful in every case.    Sperm count test    You will have a sperm-count test after the vasectomy. You should have had at least 20 ejaculations (discharges of semen) since your surgery. It will be a few months before you are sterile. Ten to twelve weeks after your surgery, schedule a sperm count test. Call [phone] to schedule. We will call you in 2 weeks with the results.    If you have any questions, please contact us:    [facility] (nurse line): [phone]

## 2024-12-20 NOTE — PROGRESS NOTES
Procedure: Vasectomy bilateral.   Anesthesia: Local lidocaine injection by surgeon.  Surgeon: CONSTANTINE Sanchez M.D.   Assistant:  Celia Quintana MD    Description of procedure: The patient has received education regarding vasectomy, including risks and benefits, we obtaiined consent and proceeded with the surgery. He understands that there is a risk of late failure from recanalization. He understands that he is fertile until he has completed one or more semen analyses and he is given clearance from us for unprotected intercourse.  He understands that we will contact him regarding the results of the post-vasectomy semen analysis, but it is his responsibility to make sure he is cleared before discontinuing other birth control methods.  I have discussed with him other risks including bleeding, infection, acute and possible long-term pain.    The right vas was ligated first.  This was done using the standard technique by grasping it with a three-finger  and lifting it up to the skin.  A small amount of lidocaine was infiltrated into the skin and vasal sheath.  The skin was punctured and dilated bluntly using the scalpel-less dissector.  The sheath was identified and a rigid clamp was passed around the vas which was then lifted out of the incision.  The vas was cleaned off from the deferential vessels and the sheath, and cautery was used to divide the vas between mosquitos.  A small segment was removed and discarded.  The lumen of the vas was cannulated to confirm its identity, and the lumens of both ends were cauterized.  Pen cautery was used sparingly/as needed for minor bleeders.  A facial interposition was then performed with a single suture of 4-0 chromic. The skin defect was closed with a 4-0 chromic interrupted suture after confirming adequate hemostasis.       We then turned our attention to the left side and a similar technique was performed. This involved division, excision of a segment, cautery of the lumens,  and fascial interposition.    There were no hematomas noted at the end of the procedure.  The patient tolerated the procedure well.    He was advised to return for a post vasectomy semen check in 2-3 months, and that he is not sterile and must continue to use contraception until we tell him otherwise (based on follow-up semen specimen(s)).    Plan:  -Post vasectomy semen analysis in 2-3 months   -ice packs to scrotum X 24h  -shower ok tomorrow  -OTC analgesics recommended     I was present and scrubbed for the entire procedure.  Marcin Sanchez MD  Urology Staff

## (undated) RX ORDER — LIDOCAINE HYDROCHLORIDE 20 MG/ML
INJECTION, SOLUTION INFILTRATION; PERINEURAL
Status: DISPENSED
Start: 2024-12-19